# Patient Record
Sex: FEMALE | Race: BLACK OR AFRICAN AMERICAN | NOT HISPANIC OR LATINO | Employment: UNEMPLOYED | URBAN - METROPOLITAN AREA
[De-identification: names, ages, dates, MRNs, and addresses within clinical notes are randomized per-mention and may not be internally consistent; named-entity substitution may affect disease eponyms.]

---

## 2021-02-24 LAB
ABO + RH BLD: NORMAL
HBV SURFACE AG SERPL QL IA: NEGATIVE
HCT VFR BLD AUTO: 35 % (ref 36–46)
HEMOGLOBIN BANDS: NORMAL
HGB BLD-MCNC: 11.6 G/DL (ref 12–16)
HIV 1+2 AB+HIV1 P24 AG SERPL QL IA: NONREACTIVE
INDIRECT COOMBS: NORMAL
PLATELET # BLD AUTO: 274 K/UL (ref 150–399)
RPR: NONREACTIVE
RUBELLA IMMUNE STATUS: NORMAL

## 2021-06-17 ENCOUNTER — INITIAL PRENATAL (OUTPATIENT)
Dept: OBSTETRICS AND GYNECOLOGY | Facility: CLINIC | Age: 26
End: 2021-06-17
Payer: MEDICAID

## 2021-06-17 VITALS — SYSTOLIC BLOOD PRESSURE: 110 MMHG | DIASTOLIC BLOOD PRESSURE: 64 MMHG | WEIGHT: 176.81 LBS

## 2021-06-17 DIAGNOSIS — Z3A.22 22 WEEKS GESTATION OF PREGNANCY: Primary | ICD-10-CM

## 2021-06-17 LAB
BILIRUB SERPL-MCNC: NORMAL MG/DL
BLOOD URINE, POC: NORMAL
CLARITY, POC UA: CLEAR
COLOR, POC UA: YELLOW
GLUCOSE UR QL STRIP: NORMAL
KETONES UR QL STRIP: NORMAL
LEUKOCYTE ESTERASE URINE, POC: NORMAL
NITRITE, POC UA: NORMAL
PH, POC UA: 6
PROTEIN, POC: NORMAL
SPECIFIC GRAVITY, POC UA: NORMAL
UROBILINOGEN, POC UA: NORMAL

## 2021-06-17 PROCEDURE — 99203 PR OFFICE/OUTPT VISIT, NEW, LEVL III, 30-44 MIN: ICD-10-PCS | Mod: TH,S$PBB,, | Performed by: OBSTETRICS & GYNECOLOGY

## 2021-06-17 PROCEDURE — 99999 PR PBB SHADOW E&M-NEW PATIENT-LVL II: ICD-10-PCS | Mod: PBBFAC,,, | Performed by: OBSTETRICS & GYNECOLOGY

## 2021-06-17 PROCEDURE — 99202 OFFICE O/P NEW SF 15 MIN: CPT | Mod: PBBFAC,PN | Performed by: OBSTETRICS & GYNECOLOGY

## 2021-06-17 PROCEDURE — 81002 URINALYSIS NONAUTO W/O SCOPE: CPT | Mod: PBBFAC,PN | Performed by: OBSTETRICS & GYNECOLOGY

## 2021-06-17 PROCEDURE — 99999 PR PBB SHADOW E&M-NEW PATIENT-LVL II: CPT | Mod: PBBFAC,,, | Performed by: OBSTETRICS & GYNECOLOGY

## 2021-06-17 PROCEDURE — 99203 OFFICE O/P NEW LOW 30 MIN: CPT | Mod: TH,S$PBB,, | Performed by: OBSTETRICS & GYNECOLOGY

## 2021-06-22 ENCOUNTER — DOCUMENTATION ONLY (OUTPATIENT)
Dept: OBSTETRICS AND GYNECOLOGY | Facility: CLINIC | Age: 26
End: 2021-06-22

## 2021-07-01 ENCOUNTER — HOSPITAL ENCOUNTER (OUTPATIENT)
Dept: RADIOLOGY | Facility: HOSPITAL | Age: 26
Discharge: HOME | End: 2021-07-01
Attending: OBSTETRICS & GYNECOLOGY
Payer: MEDICAID

## 2021-07-01 DIAGNOSIS — Z3A.22 22 WEEKS GESTATION OF PREGNANCY: ICD-10-CM

## 2021-07-01 PROCEDURE — 76805 US OB 14+ WEEKS, TRANSABDOM, SINGLE GESTATION: ICD-10-PCS | Mod: 26,,, | Performed by: RADIOLOGY

## 2021-07-01 PROCEDURE — 76805 OB US >/= 14 WKS SNGL FETUS: CPT | Mod: 26,,, | Performed by: RADIOLOGY

## 2021-07-01 PROCEDURE — 76805 OB US >/= 14 WKS SNGL FETUS: CPT | Mod: TC,PN

## 2021-07-02 ENCOUNTER — ROUTINE PRENATAL (OUTPATIENT)
Dept: OBSTETRICS AND GYNECOLOGY | Facility: CLINIC | Age: 26
End: 2021-07-02
Payer: MEDICAID

## 2021-07-02 VITALS — WEIGHT: 179.88 LBS | SYSTOLIC BLOOD PRESSURE: 120 MMHG | DIASTOLIC BLOOD PRESSURE: 60 MMHG

## 2021-07-02 DIAGNOSIS — Z3A.24 24 WEEKS GESTATION OF PREGNANCY: Primary | ICD-10-CM

## 2021-07-02 LAB
BILIRUB SERPL-MCNC: NORMAL MG/DL
BLOOD URINE, POC: NORMAL
CLARITY, POC UA: NORMAL
COLOR, POC UA: NORMAL
GLUCOSE UR QL STRIP: NORMAL
KETONES UR QL STRIP: NORMAL
LEUKOCYTE ESTERASE URINE, POC: NORMAL
NITRITE, POC UA: NORMAL
PH, POC UA: 6
PROTEIN, POC: NORMAL
SPECIFIC GRAVITY, POC UA: NORMAL
UROBILINOGEN, POC UA: NORMAL

## 2021-07-02 PROCEDURE — 99211 OFF/OP EST MAY X REQ PHY/QHP: CPT | Mod: PBBFAC,PN | Performed by: SPECIALIST

## 2021-07-02 PROCEDURE — 99999 PR PBB SHADOW E&M-EST. PATIENT-LVL I: CPT | Mod: PBBFAC,,, | Performed by: SPECIALIST

## 2021-07-02 PROCEDURE — 99999 PR PBB SHADOW E&M-EST. PATIENT-LVL I: ICD-10-PCS | Mod: PBBFAC,,, | Performed by: SPECIALIST

## 2021-07-02 PROCEDURE — 99213 PR OFFICE/OUTPT VISIT, EST, LEVL III, 20-29 MIN: ICD-10-PCS | Mod: TH,S$PBB,, | Performed by: SPECIALIST

## 2021-07-02 PROCEDURE — 99213 OFFICE O/P EST LOW 20 MIN: CPT | Mod: TH,S$PBB,, | Performed by: SPECIALIST

## 2021-07-02 PROCEDURE — 81002 URINALYSIS NONAUTO W/O SCOPE: CPT | Mod: PBBFAC,PN | Performed by: SPECIALIST

## 2021-07-06 ENCOUNTER — PATIENT MESSAGE (OUTPATIENT)
Dept: ADMINISTRATIVE | Facility: OTHER | Age: 26
End: 2021-07-06

## 2021-07-30 ENCOUNTER — LAB VISIT (OUTPATIENT)
Dept: LAB | Facility: HOSPITAL | Age: 26
End: 2021-07-30
Attending: SPECIALIST
Payer: MEDICAID

## 2021-07-30 ENCOUNTER — ROUTINE PRENATAL (OUTPATIENT)
Dept: OBSTETRICS AND GYNECOLOGY | Facility: CLINIC | Age: 26
End: 2021-07-30
Payer: MEDICAID

## 2021-07-30 VITALS — SYSTOLIC BLOOD PRESSURE: 122 MMHG | WEIGHT: 180.75 LBS | DIASTOLIC BLOOD PRESSURE: 70 MMHG

## 2021-07-30 DIAGNOSIS — Z3A.24 24 WEEKS GESTATION OF PREGNANCY: ICD-10-CM

## 2021-07-30 DIAGNOSIS — Z3A.28 28 WEEKS GESTATION OF PREGNANCY: Primary | ICD-10-CM

## 2021-07-30 LAB
BILIRUB SERPL-MCNC: NEGATIVE MG/DL
BLOOD URINE, POC: NEGATIVE
CLARITY, POC UA: NORMAL
COLOR, POC UA: NORMAL
GLUCOSE SERPL-MCNC: 127 MG/DL (ref 70–140)
GLUCOSE UR QL STRIP: NORMAL
KETONES UR QL STRIP: NEGATIVE
LEUKOCYTE ESTERASE URINE, POC: NORMAL
NITRITE, POC UA: NEGATIVE
PH, POC UA: 7
PROTEIN, POC: NORMAL
SPECIFIC GRAVITY, POC UA: NORMAL
UROBILINOGEN, POC UA: NORMAL

## 2021-07-30 PROCEDURE — 99999 PR PBB SHADOW E&M-EST. PATIENT-LVL II: CPT | Mod: PBBFAC,,, | Performed by: OBSTETRICS & GYNECOLOGY

## 2021-07-30 PROCEDURE — 81002 URINALYSIS NONAUTO W/O SCOPE: CPT | Mod: PBBFAC,PN | Performed by: OBSTETRICS & GYNECOLOGY

## 2021-07-30 PROCEDURE — 99213 OFFICE O/P EST LOW 20 MIN: CPT | Mod: TH,S$PBB,, | Performed by: OBSTETRICS & GYNECOLOGY

## 2021-07-30 PROCEDURE — 99212 OFFICE O/P EST SF 10 MIN: CPT | Mod: PBBFAC,PN | Performed by: OBSTETRICS & GYNECOLOGY

## 2021-07-30 PROCEDURE — 99999 PR PBB SHADOW E&M-EST. PATIENT-LVL II: ICD-10-PCS | Mod: PBBFAC,,, | Performed by: OBSTETRICS & GYNECOLOGY

## 2021-07-30 PROCEDURE — 36415 COLL VENOUS BLD VENIPUNCTURE: CPT | Mod: PO | Performed by: SPECIALIST

## 2021-07-30 PROCEDURE — 99213 PR OFFICE/OUTPT VISIT, EST, LEVL III, 20-29 MIN: ICD-10-PCS | Mod: TH,S$PBB,, | Performed by: OBSTETRICS & GYNECOLOGY

## 2021-07-30 PROCEDURE — 82950 GLUCOSE TEST: CPT | Performed by: SPECIALIST

## 2021-08-13 ENCOUNTER — ROUTINE PRENATAL (OUTPATIENT)
Dept: OBSTETRICS AND GYNECOLOGY | Facility: CLINIC | Age: 26
End: 2021-08-13
Payer: MEDICAID

## 2021-08-13 VITALS — SYSTOLIC BLOOD PRESSURE: 112 MMHG | DIASTOLIC BLOOD PRESSURE: 70 MMHG | WEIGHT: 181.44 LBS

## 2021-08-13 DIAGNOSIS — Z3A.30 30 WEEKS GESTATION OF PREGNANCY: Primary | ICD-10-CM

## 2021-08-13 LAB
BILIRUB SERPL-MCNC: NORMAL MG/DL
BLOOD URINE, POC: NORMAL
CLARITY, POC UA: NORMAL
COLOR, POC UA: NORMAL
GLUCOSE UR QL STRIP: NORMAL
KETONES UR QL STRIP: NORMAL
LEUKOCYTE ESTERASE URINE, POC: NORMAL
NITRITE, POC UA: NORMAL
PH, POC UA: 5
PROTEIN, POC: NORMAL
SPECIFIC GRAVITY, POC UA: NORMAL
UROBILINOGEN, POC UA: NORMAL

## 2021-08-13 PROCEDURE — 99999 PR PBB SHADOW E&M-EST. PATIENT-LVL II: CPT | Mod: PBBFAC,,, | Performed by: OBSTETRICS & GYNECOLOGY

## 2021-08-13 PROCEDURE — 81002 URINALYSIS NONAUTO W/O SCOPE: CPT | Mod: PBBFAC,PN | Performed by: OBSTETRICS & GYNECOLOGY

## 2021-08-13 PROCEDURE — 99213 PR OFFICE/OUTPT VISIT, EST, LEVL III, 20-29 MIN: ICD-10-PCS | Mod: TH,S$PBB,, | Performed by: OBSTETRICS & GYNECOLOGY

## 2021-08-13 PROCEDURE — 99999 PR PBB SHADOW E&M-EST. PATIENT-LVL II: ICD-10-PCS | Mod: PBBFAC,,, | Performed by: OBSTETRICS & GYNECOLOGY

## 2021-08-13 PROCEDURE — 99213 OFFICE O/P EST LOW 20 MIN: CPT | Mod: TH,S$PBB,, | Performed by: OBSTETRICS & GYNECOLOGY

## 2021-08-13 PROCEDURE — 99212 OFFICE O/P EST SF 10 MIN: CPT | Mod: PBBFAC,PN | Performed by: OBSTETRICS & GYNECOLOGY

## 2021-08-19 ENCOUNTER — PATIENT MESSAGE (OUTPATIENT)
Dept: ADMINISTRATIVE | Facility: OTHER | Age: 26
End: 2021-08-19

## 2021-08-26 ENCOUNTER — ROUTINE PRENATAL (OUTPATIENT)
Dept: OBSTETRICS AND GYNECOLOGY | Facility: CLINIC | Age: 26
End: 2021-08-26
Payer: MEDICAID

## 2021-08-26 VITALS — WEIGHT: 186.31 LBS | SYSTOLIC BLOOD PRESSURE: 112 MMHG | DIASTOLIC BLOOD PRESSURE: 64 MMHG

## 2021-08-26 DIAGNOSIS — Z3A.33 33 WEEKS GESTATION OF PREGNANCY: Primary | ICD-10-CM

## 2021-08-26 LAB
BILIRUB SERPL-MCNC: NORMAL MG/DL
BLOOD URINE, POC: NORMAL
CLARITY, POC UA: CLEAR
COLOR, POC UA: NORMAL
GLUCOSE UR QL STRIP: NORMAL
KETONES UR QL STRIP: NORMAL
LEUKOCYTE ESTERASE URINE, POC: NORMAL
NITRITE, POC UA: NORMAL
PH, POC UA: 8
PROTEIN, POC: NORMAL
SPECIFIC GRAVITY, POC UA: NORMAL
UROBILINOGEN, POC UA: NORMAL

## 2021-08-26 PROCEDURE — 99213 PR OFFICE/OUTPT VISIT, EST, LEVL III, 20-29 MIN: ICD-10-PCS | Mod: TH,S$PBB,, | Performed by: OBSTETRICS & GYNECOLOGY

## 2021-08-26 PROCEDURE — 99999 PR PBB SHADOW E&M-EST. PATIENT-LVL II: ICD-10-PCS | Mod: PBBFAC,,, | Performed by: OBSTETRICS & GYNECOLOGY

## 2021-08-26 PROCEDURE — 81002 URINALYSIS NONAUTO W/O SCOPE: CPT | Mod: PBBFAC,PN | Performed by: OBSTETRICS & GYNECOLOGY

## 2021-08-26 PROCEDURE — 99213 OFFICE O/P EST LOW 20 MIN: CPT | Mod: TH,S$PBB,, | Performed by: OBSTETRICS & GYNECOLOGY

## 2021-08-26 PROCEDURE — 99999 PR PBB SHADOW E&M-EST. PATIENT-LVL II: CPT | Mod: PBBFAC,,, | Performed by: OBSTETRICS & GYNECOLOGY

## 2021-08-26 PROCEDURE — 99212 OFFICE O/P EST SF 10 MIN: CPT | Mod: PBBFAC,PN | Performed by: OBSTETRICS & GYNECOLOGY

## 2021-09-08 ENCOUNTER — TELEPHONE (OUTPATIENT)
Dept: PEDIATRICS | Facility: CLINIC | Age: 26
End: 2021-09-08

## 2021-09-13 ENCOUNTER — ROUTINE PRENATAL (OUTPATIENT)
Dept: OBSTETRICS AND GYNECOLOGY | Facility: CLINIC | Age: 26
End: 2021-09-13
Payer: MEDICAID

## 2021-09-13 ENCOUNTER — HOSPITAL ENCOUNTER (OUTPATIENT)
Dept: RADIOLOGY | Facility: HOSPITAL | Age: 26
Discharge: HOME | End: 2021-09-13
Attending: OBSTETRICS & GYNECOLOGY
Payer: MEDICAID

## 2021-09-13 DIAGNOSIS — Z3A.35 35 WEEKS GESTATION OF PREGNANCY: Primary | ICD-10-CM

## 2021-09-13 DIAGNOSIS — Z3A.33 33 WEEKS GESTATION OF PREGNANCY: ICD-10-CM

## 2021-09-13 LAB
BILIRUB SERPL-MCNC: NEGATIVE MG/DL
BLOOD URINE, POC: NEGATIVE
CLARITY, POC UA: NORMAL
COLOR, POC UA: NORMAL
GLUCOSE UR QL STRIP: NORMAL
KETONES UR QL STRIP: NORMAL
LEUKOCYTE ESTERASE URINE, POC: NORMAL
NITRITE, POC UA: NEGATIVE
PH, POC UA: 5
PROTEIN, POC: NEGATIVE
SPECIFIC GRAVITY, POC UA: NORMAL
UROBILINOGEN, POC UA: NORMAL

## 2021-09-13 PROCEDURE — 99213 PR OFFICE/OUTPT VISIT, EST, LEVL III, 20-29 MIN: ICD-10-PCS | Mod: TH,S$PBB,, | Performed by: OBSTETRICS & GYNECOLOGY

## 2021-09-13 PROCEDURE — 99999 PR PBB SHADOW E&M-EST. PATIENT-LVL I: CPT | Mod: PBBFAC,,, | Performed by: OBSTETRICS & GYNECOLOGY

## 2021-09-13 PROCEDURE — 76816 OB US FOLLOW-UP PER FETUS: CPT | Mod: 26,,, | Performed by: RADIOLOGY

## 2021-09-13 PROCEDURE — 99213 OFFICE O/P EST LOW 20 MIN: CPT | Mod: TH,S$PBB,, | Performed by: OBSTETRICS & GYNECOLOGY

## 2021-09-13 PROCEDURE — 76816 US OB FOLLOW UP EA GESTATION TRANSABDOMINAL: ICD-10-PCS | Mod: 26,,, | Performed by: RADIOLOGY

## 2021-09-13 PROCEDURE — 76816 OB US FOLLOW-UP PER FETUS: CPT | Mod: TC,PN

## 2021-09-13 PROCEDURE — 81002 URINALYSIS NONAUTO W/O SCOPE: CPT | Mod: PBBFAC,PN | Performed by: OBSTETRICS & GYNECOLOGY

## 2021-09-13 PROCEDURE — 87086 URINE CULTURE/COLONY COUNT: CPT | Performed by: OBSTETRICS & GYNECOLOGY

## 2021-09-13 PROCEDURE — 99999 PR PBB SHADOW E&M-EST. PATIENT-LVL I: ICD-10-PCS | Mod: PBBFAC,,, | Performed by: OBSTETRICS & GYNECOLOGY

## 2021-09-13 PROCEDURE — 87081 CULTURE SCREEN ONLY: CPT | Performed by: OBSTETRICS & GYNECOLOGY

## 2021-09-13 PROCEDURE — 99211 OFF/OP EST MAY X REQ PHY/QHP: CPT | Mod: PBBFAC,PN | Performed by: OBSTETRICS & GYNECOLOGY

## 2021-09-15 LAB
BACTERIA UR CULT: NORMAL
BACTERIA UR CULT: NORMAL

## 2021-09-17 LAB — BACTERIA SPEC AEROBE CULT: NORMAL

## 2021-09-21 ENCOUNTER — ROUTINE PRENATAL (OUTPATIENT)
Dept: OBSTETRICS AND GYNECOLOGY | Facility: CLINIC | Age: 26
End: 2021-09-21
Payer: MEDICAID

## 2021-09-21 VITALS — DIASTOLIC BLOOD PRESSURE: 80 MMHG | WEIGHT: 191.81 LBS | SYSTOLIC BLOOD PRESSURE: 130 MMHG

## 2021-09-21 DIAGNOSIS — Z3A.36 36 WEEKS GESTATION OF PREGNANCY: Primary | ICD-10-CM

## 2021-09-21 LAB
BILIRUB SERPL-MCNC: NORMAL MG/DL
BLOOD URINE, POC: NORMAL
CLARITY, POC UA: CLEAR
COLOR, POC UA: NORMAL
GLUCOSE UR QL STRIP: NORMAL
KETONES UR QL STRIP: NORMAL
LEUKOCYTE ESTERASE URINE, POC: NORMAL
NITRITE, POC UA: NORMAL
PH, POC UA: 5
PROTEIN, POC: NORMAL
SPECIFIC GRAVITY, POC UA: NORMAL
UROBILINOGEN, POC UA: NORMAL

## 2021-09-21 PROCEDURE — 99999 PR PBB SHADOW E&M-EST. PATIENT-LVL II: CPT | Mod: PBBFAC,,, | Performed by: OBSTETRICS & GYNECOLOGY

## 2021-09-21 PROCEDURE — 99999 PR PBB SHADOW E&M-EST. PATIENT-LVL II: ICD-10-PCS | Mod: PBBFAC,,, | Performed by: OBSTETRICS & GYNECOLOGY

## 2021-09-21 PROCEDURE — 99213 OFFICE O/P EST LOW 20 MIN: CPT | Mod: TH,S$PBB,, | Performed by: OBSTETRICS & GYNECOLOGY

## 2021-09-21 PROCEDURE — 81002 URINALYSIS NONAUTO W/O SCOPE: CPT | Mod: PBBFAC,PN | Performed by: OBSTETRICS & GYNECOLOGY

## 2021-09-21 PROCEDURE — 99212 OFFICE O/P EST SF 10 MIN: CPT | Mod: PBBFAC,PN | Performed by: OBSTETRICS & GYNECOLOGY

## 2021-09-21 PROCEDURE — 99213 PR OFFICE/OUTPT VISIT, EST, LEVL III, 20-29 MIN: ICD-10-PCS | Mod: TH,S$PBB,, | Performed by: OBSTETRICS & GYNECOLOGY

## 2021-09-24 ENCOUNTER — TELEPHONE (OUTPATIENT)
Dept: OBSTETRICS AND GYNECOLOGY | Facility: CLINIC | Age: 26
End: 2021-09-24

## 2021-10-01 ENCOUNTER — TELEPHONE (OUTPATIENT)
Dept: OBSTETRICS AND GYNECOLOGY | Facility: CLINIC | Age: 26
End: 2021-10-01

## 2021-10-04 ENCOUNTER — ROUTINE PRENATAL (OUTPATIENT)
Dept: OBSTETRICS AND GYNECOLOGY | Facility: CLINIC | Age: 26
End: 2021-10-04
Payer: MEDICAID

## 2021-10-04 VITALS — DIASTOLIC BLOOD PRESSURE: 88 MMHG | SYSTOLIC BLOOD PRESSURE: 120 MMHG | WEIGHT: 193.13 LBS

## 2021-10-04 DIAGNOSIS — Z3A.38 38 WEEKS GESTATION OF PREGNANCY: Primary | ICD-10-CM

## 2021-10-04 LAB
BILIRUB SERPL-MCNC: NEGATIVE MG/DL
BLOOD URINE, POC: NEGATIVE
CLARITY, POC UA: NORMAL
COLOR, POC UA: NORMAL
GLUCOSE UR QL STRIP: NORMAL
KETONES UR QL STRIP: NEGATIVE
LEUKOCYTE ESTERASE URINE, POC: NEGATIVE
NITRITE, POC UA: NEGATIVE
PH, POC UA: 7
PROTEIN, POC: NEGATIVE
SPECIFIC GRAVITY, POC UA: NORMAL
UROBILINOGEN, POC UA: NORMAL

## 2021-10-04 PROCEDURE — 99213 PR OFFICE/OUTPT VISIT, EST, LEVL III, 20-29 MIN: ICD-10-PCS | Mod: TH,S$PBB,, | Performed by: OBSTETRICS & GYNECOLOGY

## 2021-10-04 PROCEDURE — 99999 PR PBB SHADOW E&M-EST. PATIENT-LVL II: ICD-10-PCS | Mod: PBBFAC,,, | Performed by: OBSTETRICS & GYNECOLOGY

## 2021-10-04 PROCEDURE — 99213 OFFICE O/P EST LOW 20 MIN: CPT | Mod: TH,S$PBB,, | Performed by: OBSTETRICS & GYNECOLOGY

## 2021-10-04 PROCEDURE — 81002 URINALYSIS NONAUTO W/O SCOPE: CPT | Mod: PBBFAC,PN | Performed by: OBSTETRICS & GYNECOLOGY

## 2021-10-04 PROCEDURE — 99999 PR PBB SHADOW E&M-EST. PATIENT-LVL II: CPT | Mod: PBBFAC,,, | Performed by: OBSTETRICS & GYNECOLOGY

## 2021-10-04 PROCEDURE — 99212 OFFICE O/P EST SF 10 MIN: CPT | Mod: PBBFAC,PN | Performed by: OBSTETRICS & GYNECOLOGY

## 2021-10-09 PROBLEM — Z34.90 TERM PREGNANCY: Status: ACTIVE | Noted: 2021-10-09

## 2021-10-13 ENCOUNTER — TELEPHONE (OUTPATIENT)
Dept: OBSTETRICS AND GYNECOLOGY | Facility: CLINIC | Age: 26
End: 2021-10-13

## 2021-10-26 ENCOUNTER — POSTPARTUM VISIT (OUTPATIENT)
Dept: OBSTETRICS AND GYNECOLOGY | Facility: CLINIC | Age: 26
End: 2021-10-26
Payer: MEDICAID

## 2021-10-26 VITALS
DIASTOLIC BLOOD PRESSURE: 66 MMHG | WEIGHT: 176.81 LBS | SYSTOLIC BLOOD PRESSURE: 116 MMHG | BODY MASS INDEX: 31.33 KG/M2 | HEIGHT: 63 IN | RESPIRATION RATE: 16 BRPM

## 2021-10-26 PROCEDURE — 59430 PR CARE AFTER DELIVERY ONLY: ICD-10-PCS | Mod: ,,, | Performed by: OBSTETRICS & GYNECOLOGY

## 2021-10-26 PROCEDURE — 99999 PR PBB SHADOW E&M-EST. PATIENT-LVL III: CPT | Mod: PBBFAC,,, | Performed by: OBSTETRICS & GYNECOLOGY

## 2021-10-26 PROCEDURE — 99213 OFFICE O/P EST LOW 20 MIN: CPT | Mod: PBBFAC,PN | Performed by: OBSTETRICS & GYNECOLOGY

## 2021-10-26 PROCEDURE — 99999 PR PBB SHADOW E&M-EST. PATIENT-LVL III: ICD-10-PCS | Mod: PBBFAC,,, | Performed by: OBSTETRICS & GYNECOLOGY

## 2021-11-29 ENCOUNTER — POSTPARTUM VISIT (OUTPATIENT)
Dept: OBSTETRICS AND GYNECOLOGY | Facility: CLINIC | Age: 26
End: 2021-11-29
Payer: MEDICAID

## 2021-11-29 VITALS
DIASTOLIC BLOOD PRESSURE: 68 MMHG | WEIGHT: 181 LBS | SYSTOLIC BLOOD PRESSURE: 116 MMHG | HEIGHT: 63 IN | BODY MASS INDEX: 32.07 KG/M2

## 2021-11-29 DIAGNOSIS — Z01.419 ENCOUNTER FOR GYNECOLOGICAL EXAMINATION WITHOUT ABNORMAL FINDING: Primary | ICD-10-CM

## 2021-11-29 PROCEDURE — 99999 PR PBB SHADOW E&M-EST. PATIENT-LVL III: ICD-10-PCS | Mod: PBBFAC,,, | Performed by: OBSTETRICS & GYNECOLOGY

## 2021-11-29 PROCEDURE — 0503F POSTPARTUM CARE VISIT: CPT | Mod: ,,, | Performed by: OBSTETRICS & GYNECOLOGY

## 2021-11-29 PROCEDURE — 88141 CYTOPATH C/V INTERPRET: CPT | Mod: ,,, | Performed by: PATHOLOGY

## 2021-11-29 PROCEDURE — 99213 OFFICE O/P EST LOW 20 MIN: CPT | Mod: PBBFAC,PN | Performed by: OBSTETRICS & GYNECOLOGY

## 2021-11-29 PROCEDURE — 88141 PR  CYTOPATH CERV/VAG INTERPRET: ICD-10-PCS | Mod: ,,, | Performed by: PATHOLOGY

## 2021-11-29 PROCEDURE — 99999 PR PBB SHADOW E&M-EST. PATIENT-LVL III: CPT | Mod: PBBFAC,,, | Performed by: OBSTETRICS & GYNECOLOGY

## 2021-11-29 PROCEDURE — 88175 CYTOPATH C/V AUTO FLUID REDO: CPT | Performed by: PATHOLOGY

## 2021-11-29 PROCEDURE — 0503F PR POSTPARTUM CARE VISIT: ICD-10-PCS | Mod: ,,, | Performed by: OBSTETRICS & GYNECOLOGY

## 2021-12-03 LAB
FINAL PATHOLOGIC DIAGNOSIS: NORMAL
Lab: NORMAL

## 2022-01-03 ENCOUNTER — LAB VISIT (OUTPATIENT)
Dept: PRIMARY CARE CLINIC | Facility: OTHER | Age: 27
End: 2022-01-03
Payer: MEDICAID

## 2022-01-03 ENCOUNTER — PATIENT MESSAGE (OUTPATIENT)
Dept: ADMINISTRATIVE | Facility: OTHER | Age: 27
End: 2022-01-03
Payer: MEDICAID

## 2022-01-03 DIAGNOSIS — Z20.822 ENCOUNTER FOR LABORATORY TESTING FOR COVID-19 VIRUS: ICD-10-CM

## 2022-01-03 PROCEDURE — U0003 INFECTIOUS AGENT DETECTION BY NUCLEIC ACID (DNA OR RNA); SEVERE ACUTE RESPIRATORY SYNDROME CORONAVIRUS 2 (SARS-COV-2) (CORONAVIRUS DISEASE [COVID-19]), AMPLIFIED PROBE TECHNIQUE, MAKING USE OF HIGH THROUGHPUT TECHNOLOGIES AS DESCRIBED BY CMS-2020-01-R: HCPCS

## 2022-01-07 LAB
SARS-COV-2 RNA RESP QL NAA+PROBE: DETECTED
SARS-COV-2- CYCLE NUMBER: 26

## 2024-05-27 ENCOUNTER — TELEPHONE (OUTPATIENT)
Dept: OBSTETRICS AND GYNECOLOGY | Facility: CLINIC | Age: 29
End: 2024-05-27

## 2024-05-27 NOTE — TELEPHONE ENCOUNTER
----- Message from Jaqui Shah sent at 5/27/2024 12:49 PM CDT -----  Contact: self  Patient is requesting a call back regarding NOB appt - positive pregnancy test. Please call back at [phone]

## 2024-06-20 DIAGNOSIS — Z34.81 ENCOUNTER FOR SUPERVISION OF NORMAL PREGNANCY IN MULTIGRAVIDA IN FIRST TRIMESTER: Primary | ICD-10-CM

## 2024-06-24 ENCOUNTER — PROCEDURE VISIT (OUTPATIENT)
Dept: OBSTETRICS AND GYNECOLOGY | Facility: CLINIC | Age: 29
End: 2024-06-24
Payer: MEDICAID

## 2024-06-24 DIAGNOSIS — Z34.81 ENCOUNTER FOR SUPERVISION OF NORMAL PREGNANCY IN MULTIGRAVIDA IN FIRST TRIMESTER: ICD-10-CM

## 2024-06-24 PROCEDURE — 76817 TRANSVAGINAL US OBSTETRIC: CPT | Mod: S$GLB,,, | Performed by: OBSTETRICS & GYNECOLOGY

## 2024-06-27 DIAGNOSIS — Z34.91 ENCOUNTER FOR PREGNANCY RELATED EXAMINATION IN FIRST TRIMESTER: Primary | ICD-10-CM

## 2024-07-01 ENCOUNTER — PROCEDURE VISIT (OUTPATIENT)
Dept: OBSTETRICS AND GYNECOLOGY | Facility: CLINIC | Age: 29
End: 2024-07-01
Payer: MEDICAID

## 2024-07-01 ENCOUNTER — PATIENT MESSAGE (OUTPATIENT)
Dept: OBSTETRICS AND GYNECOLOGY | Facility: CLINIC | Age: 29
End: 2024-07-01
Payer: MEDICAID

## 2024-07-01 DIAGNOSIS — Z34.91 ENCOUNTER FOR PREGNANCY RELATED EXAMINATION IN FIRST TRIMESTER: ICD-10-CM

## 2024-07-01 DIAGNOSIS — Z34.90 EARLY STAGE OF PREGNANCY: Primary | ICD-10-CM

## 2024-07-01 LAB — B-HCG SERPL-ACNC: NORMAL MIU/ML

## 2024-07-01 PROCEDURE — 76817 TRANSVAGINAL US OBSTETRIC: CPT | Mod: S$GLB,,, | Performed by: OBSTETRICS & GYNECOLOGY

## 2024-07-02 ENCOUNTER — OFFICE VISIT (OUTPATIENT)
Dept: OBSTETRICS AND GYNECOLOGY | Facility: CLINIC | Age: 29
End: 2024-07-02
Payer: MEDICAID

## 2024-07-02 ENCOUNTER — PROCEDURE VISIT (OUTPATIENT)
Dept: OBSTETRICS AND GYNECOLOGY | Facility: CLINIC | Age: 29
End: 2024-07-02
Payer: MEDICAID

## 2024-07-02 VITALS
HEART RATE: 99 BPM | SYSTOLIC BLOOD PRESSURE: 142 MMHG | BODY MASS INDEX: 38.26 KG/M2 | WEIGHT: 216 LBS | DIASTOLIC BLOOD PRESSURE: 94 MMHG

## 2024-07-02 DIAGNOSIS — O02.1 MISSED AB: Primary | ICD-10-CM

## 2024-07-02 DIAGNOSIS — O02.1 MISSED AB: ICD-10-CM

## 2024-07-02 PROCEDURE — 76817 TRANSVAGINAL US OBSTETRIC: CPT | Mod: S$GLB,,, | Performed by: OBSTETRICS & GYNECOLOGY

## 2024-07-02 NOTE — PROGRESS NOTES
28 y.o.  presents for MAB.  No LMP recorded (lmp unknown)..    Past Medical History:   Diagnosis Date    Abnormal Pap smear of cervix      History reviewed. No pertinent surgical history.  Family History   Problem Relation Name Age of Onset    Ovarian cancer Maternal Grandmother      Breast cancer Neg Hx       Review of patient's allergies indicates:  No Known Allergies  No current outpatient medications on file.  Social History     Socioeconomic History    Marital status: Single   Tobacco Use    Smoking status: Never    Smokeless tobacco: Never   Substance and Sexual Activity    Alcohol use: Not Currently    Drug use: Never    Sexual activity: Yes     Partners: Male     Birth control/protection: None       Review of Systems   Constitutional:  Negative for chills and fever.   Respiratory:  Negative for shortness of breath.    Cardiovascular:  Negative for chest pain.   Gastrointestinal:  Negative for abdominal pain, blood in stool, constipation, diarrhea, nausea, vomiting and reflux.   Genitourinary:  Negative for dysmenorrhea, dyspareunia, dysuria, hematuria, hot flashes, menorrhagia, menstrual problem, pelvic pain, vaginal bleeding, vaginal discharge, postcoital bleeding and vaginal dryness.   Musculoskeletal:  Negative for arthralgias and joint swelling.   Integumentary:  Negative for rash and hair changes.   Psychiatric/Behavioral:  Negative for depression. The patient is not nervous/anxious.        Vitals:    24 0732   BP: (!) 142/94   Pulse: 99       Physical Exam:   Constitutional: She appears well-developed and well-nourished. No distress.    HENT:   Head: Normocephalic.    Eyes: Conjunctivae and EOM are normal.    Neck: No tracheal deviation present. No thyromegaly present.    Cardiovascular:       Exam reveals no clubbing, no cyanosis and no edema.        Pulmonary/Chest: Effort normal. No respiratory distress.                  Musculoskeletal: Normal range of motion and moves all extremeties.         Skin: No rash noted. She is not diaphoretic. No cyanosis. Nails show no clubbing.    Psychiatric: She has a normal mood and affect. Her behavior is normal. Judgment and thought content normal.     Vaginal blood in the vault    Last ultrasound today- no gest sac- yesterday had gest sac    Assessment: SAB    Plan: rtc prn

## 2024-10-15 ENCOUNTER — TELEPHONE (OUTPATIENT)
Dept: OBSTETRICS AND GYNECOLOGY | Facility: CLINIC | Age: 29
End: 2024-10-15
Payer: MEDICAID

## 2024-10-18 DIAGNOSIS — Z03.89 ENCOUNTER FOR OBSERVATION FOR OTHER SUSPECTED DISEASES AND CONDITIONS RULED OUT: ICD-10-CM

## 2024-10-18 DIAGNOSIS — Z34.81 ENCOUNTER FOR SUPERVISION OF NORMAL PREGNANCY IN MULTIGRAVIDA IN FIRST TRIMESTER: Primary | ICD-10-CM

## 2024-10-21 ENCOUNTER — PROCEDURE VISIT (OUTPATIENT)
Dept: OBSTETRICS AND GYNECOLOGY | Facility: CLINIC | Age: 29
End: 2024-10-21
Payer: MEDICAID

## 2024-10-21 DIAGNOSIS — Z34.81 ENCOUNTER FOR SUPERVISION OF NORMAL PREGNANCY IN MULTIGRAVIDA IN FIRST TRIMESTER: ICD-10-CM

## 2024-10-21 PROCEDURE — 76801 OB US < 14 WKS SINGLE FETUS: CPT | Mod: 26,S$PBB,, | Performed by: OBSTETRICS & GYNECOLOGY

## 2024-11-11 ENCOUNTER — ROUTINE PRENATAL (OUTPATIENT)
Dept: OBSTETRICS AND GYNECOLOGY | Facility: CLINIC | Age: 29
End: 2024-11-11
Payer: MEDICAID

## 2024-11-11 VITALS
DIASTOLIC BLOOD PRESSURE: 84 MMHG | BODY MASS INDEX: 38.62 KG/M2 | HEART RATE: 91 BPM | WEIGHT: 218 LBS | SYSTOLIC BLOOD PRESSURE: 137 MMHG

## 2024-11-11 DIAGNOSIS — O16.9 ELEVATED BLOOD PRESSURE AFFECTING PREGNANCY, ANTEPARTUM: ICD-10-CM

## 2024-11-11 DIAGNOSIS — Z34.81 ENCOUNTER FOR SUPERVISION OF NORMAL PREGNANCY IN MULTIGRAVIDA IN FIRST TRIMESTER: Primary | ICD-10-CM

## 2024-11-11 NOTE — PROGRESS NOTES
Subjective:       Patient ID: Sofia Lemos is a 29 y.o.  at 12w0d   Chief Complaint:  Routine Prenatal Visit      History of Present Illness  here for new ob exam.  Labs and history were reviewed with the patient today  No complaints      Past Medical History:   Diagnosis Date    Abnormal Pap smear of cervix        No past surgical history on file.    OB:    OB History    Para Term  AB Living   3 1 1   1 1   SAB IAB Ectopic Multiple Live Births         0 1      # Outcome Date GA Lbr Frandy/2nd Weight Sex Type Anes PTL Lv   3 Current            2 Term 10/09/21 39w2d 02:00 / 00:23 3.135 kg (6 lb 14.6 oz) M Vag-Vacuum EPI N MARY   1 AB              Gyn: no STD, never had abn pap   Meds: No current outpatient medications on file.    All: Review of patient's allergies indicates:  No Known Allergies    SH:   Social History     Tobacco Use    Smoking status: Never    Smokeless tobacco: Never   Substance Use Topics    Alcohol use: Not Currently      FH: family history includes Ovarian cancer in her maternal grandmother.      Review of Systems  nml 1st trimester sx- sob, dec excercixe tolerance, fatigue and nausea  Neg for vag bleed, dc, vomiting, cp, lof, fever, chills, ns, visual changes, swelling, headaches, constipation/diarrhea, dysuria, freq/urgency of urination     Objective:     Vitals:    24 1401   BP: 137/84   Pulse: 91   Weight: 98.9 kg (218 lb)       NAD  NCAT  pupils normal size  Skin nml no rashes or lesions  No resp distress, resp even and unlabored  nt nd, no rebound no guarding  nml ext fem gent, normal cvx uterus and adnexa, no dc or bleeding  nml appearing rectum  No cyanosis or clubbing, edema appropriate for pregn    Uterus size approp for gest age         Assessment:        1. Encounter for supervision of normal pregnancy in multigravida in first trimester    2. Elevated blood pressure affecting pregnancy, antepartum               Plan:      Encounter for supervision  of normal pregnancy in multigravida in first trimester  -     Liquid-based pap smear, screening    Elevated blood pressure affecting pregnancy, antepartum  -     Liquid-based pap smear, screening           Pain fever bleeding precautions  Encouraged PNV  rtc 4 wks

## 2024-11-13 LAB
CHLAMYDIA: NEGATIVE
GONORRHEA: NEGATIVE
SOURCE: NORMAL
SOURCE: NORMAL
TRICHOMONAS AMPLIFIED: NEGATIVE

## 2024-11-14 LAB — Lab: NORMAL

## 2024-12-09 ENCOUNTER — ROUTINE PRENATAL (OUTPATIENT)
Dept: OBSTETRICS AND GYNECOLOGY | Facility: CLINIC | Age: 29
End: 2024-12-09
Payer: MEDICAID

## 2024-12-09 ENCOUNTER — PATIENT MESSAGE (OUTPATIENT)
Dept: OTHER | Facility: OTHER | Age: 29
End: 2024-12-09
Payer: MEDICAID

## 2024-12-09 VITALS
BODY MASS INDEX: 38.44 KG/M2 | SYSTOLIC BLOOD PRESSURE: 139 MMHG | DIASTOLIC BLOOD PRESSURE: 83 MMHG | HEART RATE: 103 BPM | WEIGHT: 217 LBS

## 2024-12-09 DIAGNOSIS — Z34.82 ENCOUNTER FOR SUPERVISION OF NORMAL PREGNANCY IN MULTIGRAVIDA IN SECOND TRIMESTER: Primary | ICD-10-CM

## 2024-12-09 DIAGNOSIS — O16.9 ELEVATED BLOOD PRESSURE AFFECTING PREGNANCY, ANTEPARTUM: ICD-10-CM

## 2024-12-09 PROCEDURE — 99213 OFFICE O/P EST LOW 20 MIN: CPT | Mod: S$PBB,TH,, | Performed by: OBSTETRICS & GYNECOLOGY

## 2024-12-09 NOTE — PROGRESS NOTES
Subjective:       Patient ID: Sofia Lemos is a 29 y.o.  at 16w0d      Chief Complaint:  Routine Prenatal Visit      History of Present Illness  No complaints. Reports normal sx. Labs and history reviewed with pt.       Review of Systems  Denies n/v, f/c, dysuria, contractions,   VD, VB, round ligament pain, headaches       Objective:     Vitals:    24 1052   BP: 139/83   Pulse: 103     Wt Readings from Last 3 Encounters:   24 98.4 kg (217 lb)   24 98.9 kg (218 lb)   24 98 kg (216 lb)        nad  NCAT  pupils normal size  Skin nml no rashes or lesions  No resp distress, resp even and unlabored  Gravid nt, no rebound no guarding  No cyanosis or clubbing, edema appropriate for pregn    FHT: 150's       Assessment:        1. Encounter for supervision of normal pregnancy in multigravida in second trimester    2. Elevated blood pressure affecting pregnancy, antepartum                Plan:        Encouraged PNV  Pain, fever, bleeding precautions   RTC 4 weeks

## 2024-12-16 ENCOUNTER — PATIENT MESSAGE (OUTPATIENT)
Dept: OTHER | Facility: OTHER | Age: 29
End: 2024-12-16
Payer: MEDICAID

## 2025-01-06 ENCOUNTER — PATIENT MESSAGE (OUTPATIENT)
Dept: OTHER | Facility: OTHER | Age: 30
End: 2025-01-06
Payer: MEDICAID

## 2025-01-07 DIAGNOSIS — Z34.92 ENCOUNTER FOR PREGNANCY RELATED EXAMINATION IN SECOND TRIMESTER: Primary | ICD-10-CM

## 2025-01-13 ENCOUNTER — PROCEDURE VISIT (OUTPATIENT)
Dept: OBSTETRICS AND GYNECOLOGY | Facility: CLINIC | Age: 30
End: 2025-01-13
Payer: MEDICAID

## 2025-01-13 DIAGNOSIS — Z34.92 ENCOUNTER FOR PREGNANCY RELATED EXAMINATION IN SECOND TRIMESTER: ICD-10-CM

## 2025-01-13 PROCEDURE — 76805 OB US >/= 14 WKS SNGL FETUS: CPT | Mod: 26,S$PBB,, | Performed by: OBSTETRICS & GYNECOLOGY

## 2025-02-10 ENCOUNTER — ROUTINE PRENATAL (OUTPATIENT)
Dept: OBSTETRICS AND GYNECOLOGY | Facility: CLINIC | Age: 30
End: 2025-02-10
Payer: MEDICAID

## 2025-02-10 VITALS
BODY MASS INDEX: 39.15 KG/M2 | SYSTOLIC BLOOD PRESSURE: 121 MMHG | WEIGHT: 221 LBS | DIASTOLIC BLOOD PRESSURE: 69 MMHG | HEART RATE: 86 BPM

## 2025-02-10 DIAGNOSIS — Z34.83 ENCOUNTER FOR SUPERVISION OF NORMAL PREGNANCY IN MULTIGRAVIDA IN THIRD TRIMESTER: Primary | ICD-10-CM

## 2025-02-10 PROCEDURE — 99213 OFFICE O/P EST LOW 20 MIN: CPT | Mod: S$PBB,TH,, | Performed by: OBSTETRICS & GYNECOLOGY

## 2025-02-10 NOTE — PROGRESS NOTES
Subjective:       Patient ID: Sofia Lemos is a 29 y.o.  at 25w0d     Chief Complaint:  Routine Prenatal Visit      History of Present Illness  No complaints. Reports normal sx. Labs and history reviewed with pt.         Review of Systems  Denies n/v, f/c, dysuria, contractions,   VD, VB, round ligament pain, headaches, preE ROS       Objective:     Vitals:    02/10/25 1110   BP: 121/69   Pulse: 86     Wt Readings from Last 3 Encounters:   02/10/25 100.2 kg (221 lb)   24 98.4 kg (217 lb)   24 98.9 kg (218 lb)       nad  NCAT  pupils normal size  Skin nml no rashes or lesions  No resp distress, resp even and unlabored  Gravid nt, no rebound no guarding  No cyanosis or clubbing, edema appropriate for pregn    FHT: 150's      Assessment:        1. Encounter for supervision of normal pregnancy in multigravida in third trimester                Plan:      OS  Encouraged PNV  Pain, fever, bleeding precautions   RTC 3weeks

## 2025-02-17 ENCOUNTER — PATIENT MESSAGE (OUTPATIENT)
Dept: OTHER | Facility: OTHER | Age: 30
End: 2025-02-17
Payer: MEDICAID

## 2025-03-03 ENCOUNTER — PATIENT MESSAGE (OUTPATIENT)
Dept: OTHER | Facility: OTHER | Age: 30
End: 2025-03-03
Payer: MEDICAID

## 2025-03-07 ENCOUNTER — ROUTINE PRENATAL (OUTPATIENT)
Dept: OBSTETRICS AND GYNECOLOGY | Facility: CLINIC | Age: 30
End: 2025-03-07
Payer: MEDICAID

## 2025-03-07 VITALS
HEART RATE: 102 BPM | WEIGHT: 221.25 LBS | SYSTOLIC BLOOD PRESSURE: 119 MMHG | DIASTOLIC BLOOD PRESSURE: 79 MMHG | BODY MASS INDEX: 39.19 KG/M2

## 2025-03-07 DIAGNOSIS — Z34.83 ENCOUNTER FOR SUPERVISION OF NORMAL PREGNANCY IN MULTIGRAVIDA IN THIRD TRIMESTER: Primary | ICD-10-CM

## 2025-03-07 NOTE — PROGRESS NOTES
Subjective:       Patient ID: Sofia Lemos is a 29 y.o.  at 28w4d     Chief Complaint:  Routine Prenatal Visit      History of Present Illness  No complaints. Reports normal sx. Labs and history reviewed with pt.         Review of Systems  Denies n/v, f/c, dysuria, contractions,   VD, VB, round ligament pain, headaches, preE ROS       Objective:     Vitals:    25 1014   BP: 119/79   Pulse: 102     Wt Readings from Last 3 Encounters:   25 100.4 kg (221 lb 4 oz)   02/10/25 100.2 kg (221 lb)   24 98.4 kg (217 lb)       nad  NCAT  pupils normal size  Skin nml no rashes or lesions  No resp distress, resp even and unlabored  Gravid nt, no rebound no guarding  No cyanosis or clubbing, edema appropriate for pregn    FHT: 150's      Assessment:        1. Encounter for supervision of normal pregnancy in multigravida in third trimester                Plan:        Encouraged PNV  Pain, fever, bleeding precautions   RTC 3 weeks

## 2025-03-17 ENCOUNTER — PATIENT MESSAGE (OUTPATIENT)
Dept: OTHER | Facility: OTHER | Age: 30
End: 2025-03-17
Payer: MEDICAID

## 2025-03-20 ENCOUNTER — NURSE TRIAGE (OUTPATIENT)
Dept: ADMINISTRATIVE | Facility: CLINIC | Age: 30
End: 2025-03-20
Payer: MEDICAID

## 2025-03-20 NOTE — TELEPHONE ENCOUNTER
Pt is calling and states that she was sitting on a fabric stool gardening and she fell. She is currently 30 weeks pregnant. No more than a foot high. Denies bleeding and leakage of fluid. Denies any abdominal pain or cramping. Pt does feel the baby move. Pt states she fell right on her buttocks and  was able to get right up. Dispo- Home care. Pt educated and advised to call back with any further concerns.         Reason for Disposition   Slipped or lost balance, but did not fall to ground or floor    Additional Information   Negative: Major injury from dangerous force (e.g., fall > 10 feet or 3 meters)   Negative: [1] Major bleeding (e.g., actively dripping or spurting) AND [2] can't be stopped   Negative: Shock suspected (e.g., cold/pale/clammy skin, too weak to stand)   Negative: SEVERE abdominal pain   Negative: [1] Vaginal bleeding AND [2] pregnant 20 or more weeks   Negative: Sounds like a life-threatening emergency to the triager   Negative: [1] Vaginal bleeding AND [2] pregnant < 20 weeks  (Exception: Single episode of spotting.)   Negative: [1] Abdominal pain (not severe) AND [2] pregnant < 20 weeks   Negative: [1] Abdominal pain (not severe) AND [2] present > 1 hour   Negative: Sounds like a serious injury to the triager   Negative: [1] Pregnant 20 or more weeks AND [2] fall to ground or floor (e.g., walking and tripped)   Negative: [1] Pregnant 20 or more weeks AND [2] contractions   Negative: [1] Pregnant 20 or more weeks AND [2] abdomen pain   Negative: [1] Pregnant 20 or more weeks AND [2] leaking fluid   Negative: [1] Pregnant 23 or more weeks AND [2] no fetal movements or decreased fetal movements (e.g., kick count < 5 in 1 hour or < 10 in 2 hours)   Negative: Suspicious history for the injury   Negative: [1] Caller has URGENT question AND [2] triager unable to answer question   Negative: [1] No prior tetanus shots (or is not fully vaccinated) AND [2] any wound (e.g., cut, scrape)   Negative: [1] HIV  positive or severe immunodeficiency (severely weak immune system) AND [2] DIRTY cut or scrape   Negative: [1] Caller has NON-URGENT question AND [2] triager unable to answer question   Negative: [1] Last tetanus shot > 5 years ago AND [2] DIRTY cut or scrape   Negative: [1] Last tetanus shot > 10 years ago AND [2] CLEAN cut or scrape (e.g., object AND skin were clean)   Negative: [1] Pregnant < 20 weeks AND [2] fall to ground (e.g., walking and tripped) with no abdomen injury    Protocols used: Pregnancy - Fall-A-AH

## 2025-03-21 ENCOUNTER — ROUTINE PRENATAL (OUTPATIENT)
Dept: OBSTETRICS AND GYNECOLOGY | Facility: CLINIC | Age: 30
End: 2025-03-21
Payer: MEDICAID

## 2025-03-21 VITALS
DIASTOLIC BLOOD PRESSURE: 85 MMHG | WEIGHT: 225.19 LBS | HEART RATE: 96 BPM | SYSTOLIC BLOOD PRESSURE: 125 MMHG | BODY MASS INDEX: 39.89 KG/M2

## 2025-03-21 DIAGNOSIS — O16.9 ELEVATED BLOOD PRESSURE AFFECTING PREGNANCY, ANTEPARTUM: ICD-10-CM

## 2025-03-21 DIAGNOSIS — Z34.83 ENCOUNTER FOR SUPERVISION OF NORMAL PREGNANCY IN MULTIGRAVIDA IN THIRD TRIMESTER: Primary | ICD-10-CM

## 2025-03-21 PROCEDURE — 99214 OFFICE O/P EST MOD 30 MIN: CPT | Mod: S$PBB,TH,, | Performed by: OBSTETRICS & GYNECOLOGY

## 2025-03-21 NOTE — PROGRESS NOTES
Subjective:       Patient ID: Sofia Lemos is a 29 y.o.  at 30w4d     Chief Complaint:  Routine Prenatal Visit      History of Present Illness  No complaints. Reports normal sx. Labs and history reviewed with pt.         Review of Systems  Denies n/v, f/c, dysuria, contractions,   VD, VB, round ligament pain, headaches, preE ROS       Objective:     Vitals:    25 1040   BP: 125/85   Pulse: 96     Wt Readings from Last 3 Encounters:   25 102.2 kg (225 lb 3.2 oz)   25 100.4 kg (221 lb 4 oz)   02/10/25 100.2 kg (221 lb)       nad  NCAT  pupils normal size  Skin nml no rashes or lesions  No resp distress, resp even and unlabored  Gravid nt, no rebound no guarding  No cyanosis or clubbing, edema appropriate for pregn    FHT: 150's      Assessment:        1. Encounter for supervision of normal pregnancy in multigravida in third trimester    2. Elevated blood pressure affecting pregnancy, antepartum                Plan:        Encouraged PNV  Pain, fever, bleeding precautions   RTC 2 weeks

## 2025-03-31 ENCOUNTER — PATIENT MESSAGE (OUTPATIENT)
Dept: OTHER | Facility: OTHER | Age: 30
End: 2025-03-31
Payer: MEDICAID

## 2025-04-01 ENCOUNTER — ROUTINE PRENATAL (OUTPATIENT)
Dept: OBSTETRICS AND GYNECOLOGY | Facility: CLINIC | Age: 30
End: 2025-04-01
Payer: MEDICAID

## 2025-04-01 VITALS
BODY MASS INDEX: 39.86 KG/M2 | DIASTOLIC BLOOD PRESSURE: 79 MMHG | WEIGHT: 225 LBS | SYSTOLIC BLOOD PRESSURE: 120 MMHG | HEART RATE: 118 BPM

## 2025-04-01 DIAGNOSIS — O16.9 ELEVATED BLOOD PRESSURE AFFECTING PREGNANCY, ANTEPARTUM: ICD-10-CM

## 2025-04-01 DIAGNOSIS — Z34.83 ENCOUNTER FOR SUPERVISION OF NORMAL PREGNANCY IN MULTIGRAVIDA IN THIRD TRIMESTER: Primary | ICD-10-CM

## 2025-04-01 NOTE — PROGRESS NOTES
Subjective:       Patient ID: Sofia Lemos is a 29 y.o.  at 32w1d     Chief Complaint:  Routine Prenatal Visit      History of Present Illness  No complaints. Reports normal sx. Labs and history reviewed with pt.         Review of Systems  Denies n/v, f/c, dysuria, contractions,   VD, VB, round ligament pain, headaches, preE ROS       Objective:     Vitals:    25 1028   BP: 120/79   Pulse: (!) 118     Wt Readings from Last 3 Encounters:   25 102.1 kg (225 lb)   25 102.2 kg (225 lb 3.2 oz)   25 100.4 kg (221 lb 4 oz)       nad  NCAT  pupils normal size  Skin nml no rashes or lesions  No resp distress, resp even and unlabored  Gravid nt, no rebound no guarding  No cyanosis or clubbing, edema appropriate for pregn    FHT: 150's      Assessment:        1. Encounter for supervision of normal pregnancy in multigravida in third trimester    2. Elevated blood pressure affecting pregnancy, antepartum                Plan:        Encouraged PNV  Pain, fever, bleeding precautions   [id number]

## 2025-04-15 ENCOUNTER — ROUTINE PRENATAL (OUTPATIENT)
Dept: OBSTETRICS AND GYNECOLOGY | Facility: CLINIC | Age: 30
End: 2025-04-15
Payer: MEDICAID

## 2025-04-15 ENCOUNTER — TELEPHONE (OUTPATIENT)
Dept: OBSTETRICS AND GYNECOLOGY | Facility: CLINIC | Age: 30
End: 2025-04-15
Payer: MEDICAID

## 2025-04-15 VITALS
DIASTOLIC BLOOD PRESSURE: 82 MMHG | SYSTOLIC BLOOD PRESSURE: 129 MMHG | BODY MASS INDEX: 39.86 KG/M2 | HEART RATE: 97 BPM | WEIGHT: 225 LBS

## 2025-04-15 DIAGNOSIS — Z34.83 ENCOUNTER FOR SUPERVISION OF NORMAL PREGNANCY IN MULTIGRAVIDA IN THIRD TRIMESTER: Primary | ICD-10-CM

## 2025-04-15 PROCEDURE — 99214 OFFICE O/P EST MOD 30 MIN: CPT | Mod: S$PBB,TH,, | Performed by: OBSTETRICS & GYNECOLOGY

## 2025-04-15 NOTE — PROGRESS NOTES
Subjective:       Patient ID: Sofia Lemos is a 29 y.o.  at 34w1d     Chief Complaint:  Routine Prenatal Visit      History of Present Illness  No complaints. Reports normal sx. Labs and history reviewed with pt.         Review of Systems  Denies n/v, f/c, dysuria, contractions,   VD, VB, round ligament pain, headaches, preE ROS       Objective:     Vitals:    04/15/25 1038   BP: 129/82   Pulse: 97     Wt Readings from Last 3 Encounters:   04/15/25 102.1 kg (225 lb)   25 102.1 kg (225 lb)   25 102.2 kg (225 lb 3.2 oz)       nad  NCAT  pupils normal size  Skin nml no rashes or lesions  No resp distress, resp even and unlabored  Gravid nt, no rebound no guarding  No cyanosis or clubbing, edema appropriate for pregn    FHT: 150's      Assessment:        1. Encounter for supervision of normal pregnancy in multigravida in third trimester                Plan:        Encouraged PNV  Pain, fever, bleeding precautions   RTC 1 weeks

## 2025-04-15 NOTE — TELEPHONE ENCOUNTER
----- Message from Miriam sent at 4/15/2025 11:08 AM CDT -----  Type:  Call BackWho Called:pt  Does the patient know what this is regarding?:Transfer OB care Would the patient rather a call back or a response via [facility]? Call Best Call Back Number: [phone] Information:

## 2025-04-15 NOTE — TELEPHONE ENCOUNTER
Pt states that she would like to transfer care to [facility]    Pt states she does not have any complications    Accept or defer?

## 2025-04-21 ENCOUNTER — PATIENT MESSAGE (OUTPATIENT)
Dept: OTHER | Facility: OTHER | Age: 30
End: 2025-04-21
Payer: MEDICAID

## 2025-04-29 ENCOUNTER — INITIAL PRENATAL (OUTPATIENT)
Dept: OBSTETRICS AND GYNECOLOGY | Facility: CLINIC | Age: 30
End: 2025-04-29
Payer: MEDICAID

## 2025-04-29 VITALS — WEIGHT: 227.5 LBS | BODY MASS INDEX: 40.3 KG/M2 | SYSTOLIC BLOOD PRESSURE: 120 MMHG | DIASTOLIC BLOOD PRESSURE: 78 MMHG

## 2025-04-29 DIAGNOSIS — Z3A.36 36 WEEKS GESTATION OF PREGNANCY: Primary | ICD-10-CM

## 2025-04-29 LAB
BILIRUBIN, UA POC OHS: NEGATIVE
BLOOD, UA POC OHS: NEGATIVE
CLARITY, UA POC OHS: CLEAR
COLOR, UA POC OHS: YELLOW
GLUCOSE, UA POC OHS: NEGATIVE
KETONES, UA POC OHS: NEGATIVE
LEUKOCYTES, UA POC OHS: NEGATIVE
NITRITE, UA POC OHS: NEGATIVE
PH, UA POC OHS: 7
PROTEIN, UA POC OHS: 30
SPECIFIC GRAVITY, UA POC OHS: 1.02
UROBILINOGEN, UA POC OHS: 0.2

## 2025-04-29 PROCEDURE — 99999PBSHW POCT URINALYSIS(INSTRUMENT): Mod: PBBFAC,,,

## 2025-04-29 PROCEDURE — 99212 OFFICE O/P EST SF 10 MIN: CPT | Mod: PBBFAC,PN | Performed by: OBSTETRICS & GYNECOLOGY

## 2025-04-29 PROCEDURE — 87653 STREP B DNA AMP PROBE: CPT | Performed by: OBSTETRICS & GYNECOLOGY

## 2025-04-29 PROCEDURE — 99999 PR PBB SHADOW E&M-EST. PATIENT-LVL II: CPT | Mod: PBBFAC,,, | Performed by: OBSTETRICS & GYNECOLOGY

## 2025-04-29 PROCEDURE — 81003 URINALYSIS AUTO W/O SCOPE: CPT | Mod: PBBFAC,PN | Performed by: OBSTETRICS & GYNECOLOGY

## 2025-04-29 RX ORDER — FERROUS SULFATE 325(65) MG
325 TABLET, DELAYED RELEASE (ENTERIC COATED) ORAL 2 TIMES DAILY
Qty: 60 TABLET | Refills: 1 | Status: SHIPPED | OUTPATIENT
Start: 2025-04-29

## 2025-04-29 NOTE — PROGRESS NOTES
The patient presents with routine OB complaints.   Reports: Good fetal movements reported, No Bleeding or pains    2025  29 y.o. 36w1d Estimated Date of Delivery: 25, dating reviewed.   OB History    Para Term  AB Living   3 1 1  1 1   SAB IAB Ectopic Multiple Live Births      0 1      # Outcome Date GA Lbr Frandy/2nd Weight Sex Type Anes PTL Lv   3 Current            2 Term 10/09/21 39w2d 02:00 / 00:23 3.135 kg (6 lb 14.6 oz) M Vag-Vacuum EPI N MARY   1 AB                Prenatal labs reviewed and updated today    Review of Systems:  General ROS: negative for headache or visual changes  Breast ROS: negative for breast lumps  Gastrointestinal ROS: negative for constipation, diarrhea or nausea/vomiting  Musculoskeletal ROS: negative for pain in joints or swelling in face or hands.   Neurological ROS: negative for - headaches, numbness/tingling or visual changes      Physical Exam:  /78   Wt 103.2 kg (227 lb 8.2 oz)   LMP  (LMP Unknown)   BMI 40.30 kg/m²   Urine Dip: Pending  Fundal Height:cm  Fetal Heart Tones: 155bpm  Constitutional: She is oriented to person, place, and time. She appears well-developed and well-nourished. No distress. Normal weight   Cardiovascular: Normal rate.    Pulmonary/Chest: Effort normal. No respiratory distress  Abdominal: Soft, gravid, nontender. No rebound and no guarding.     Genitourinary: 1cm / 30%    Musculoskeletal: Normal range of motion, Minimal peripheral edema.   Neurological: She is alert and oriented to person, place, and time. Coordination normal.   Skin: Skin is warm and dry. She is not diaphoretic.  Psychiatric: She has a normal mood and affect.        Assessment:  29 y.o., at 36w1d Gestation   Estimated Date of Delivery: 25  Patient Active Problem List   Diagnosis    Term pregnancy       Plan:   Labs today: none  Orders today: u/s 1 week   MEds today: none  Procedures Today: GBS done today  Follow up 1 Weeks, bleeding/pain precautions ,   kick counts, labor precautions

## 2025-05-01 ENCOUNTER — PATIENT MESSAGE (OUTPATIENT)
Dept: OBSTETRICS AND GYNECOLOGY | Facility: CLINIC | Age: 30
End: 2025-05-01
Payer: MEDICAID

## 2025-05-01 LAB — GROUP B STREPTOCOCCUS, PCR (OHS): NEGATIVE

## 2025-05-06 ENCOUNTER — HOSPITAL ENCOUNTER (OUTPATIENT)
Dept: RADIOLOGY | Facility: HOSPITAL | Age: 30
Discharge: HOME | End: 2025-05-06
Attending: OBSTETRICS & GYNECOLOGY
Payer: MEDICAID

## 2025-05-06 ENCOUNTER — ROUTINE PRENATAL (OUTPATIENT)
Dept: OBSTETRICS AND GYNECOLOGY | Facility: CLINIC | Age: 30
End: 2025-05-06
Payer: MEDICAID

## 2025-05-06 VITALS
SYSTOLIC BLOOD PRESSURE: 120 MMHG | DIASTOLIC BLOOD PRESSURE: 68 MMHG | WEIGHT: 229.25 LBS | BODY MASS INDEX: 40.61 KG/M2

## 2025-05-06 DIAGNOSIS — Z3A.37 37 WEEKS GESTATION OF PREGNANCY: Primary | ICD-10-CM

## 2025-05-06 DIAGNOSIS — Z3A.36 36 WEEKS GESTATION OF PREGNANCY: ICD-10-CM

## 2025-05-06 LAB
BILIRUBIN, UA POC OHS: NEGATIVE
BLOOD, UA POC OHS: NEGATIVE
CLARITY, UA POC OHS: CLEAR
COLOR, UA POC OHS: YELLOW
GLUCOSE, UA POC OHS: NEGATIVE
KETONES, UA POC OHS: NEGATIVE
LEUKOCYTES, UA POC OHS: ABNORMAL
NITRITE, UA POC OHS: NEGATIVE
PH, UA POC OHS: 7
PROTEIN, UA POC OHS: NEGATIVE
SPECIFIC GRAVITY, UA POC OHS: <=1.005
UROBILINOGEN, UA POC OHS: 0.2

## 2025-05-06 PROCEDURE — 99999PBSHW POCT URINALYSIS(INSTRUMENT): Mod: PBBFAC,,,

## 2025-05-06 PROCEDURE — 99999 PR PBB SHADOW E&M-EST. PATIENT-LVL II: CPT | Mod: PBBFAC,,, | Performed by: OBSTETRICS & GYNECOLOGY

## 2025-05-06 PROCEDURE — 99212 OFFICE O/P EST SF 10 MIN: CPT | Mod: PBBFAC,25,PN | Performed by: OBSTETRICS & GYNECOLOGY

## 2025-05-06 PROCEDURE — 81003 URINALYSIS AUTO W/O SCOPE: CPT | Mod: PBBFAC,PN | Performed by: OBSTETRICS & GYNECOLOGY

## 2025-05-06 PROCEDURE — 76816 OB US FOLLOW-UP PER FETUS: CPT | Mod: TC,PN

## 2025-05-06 NOTE — PROGRESS NOTES
The patient presents with routine OB complaints.   Reports: Good fetal movements reported, No Bleeding or pains    2025  29 y.o. 37w1d Estimated Date of Delivery: 25, dating reviewed.   OB History    Para Term  AB Living   3 1 1  1 1   SAB IAB Ectopic Multiple Live Births      0 1      # Outcome Date GA Lbr Frandy/2nd Weight Sex Type Anes PTL Lv   3 Current            2 Term 10/09/21 39w2d 02:00 / 00:23 3.135 kg (6 lb 14.6 oz) M Vag-Vacuum EPI N MARY   1 AB                Prenatal labs reviewed and updated today    Review of Systems:  General ROS: negative for headache or visual changes  Breast ROS: negative for breast lumps  Gastrointestinal ROS: negative for  constipation, diarrhea or nausea/vomiting  Musculoskeletal ROS: negative for pain in joints or swelling in face or hands.   Neurological ROS: negative for - headaches, numbness/tingling or visual changes      Physical Exam:  /68   Wt 104 kg (229 lb 4.5 oz)   LMP  (LMP Unknown)   BMI 40.61 kg/m²   Urine Dip: Pending  Fundal Height:36cm  Fetal Heart Tones: 129bpm  Constitutional: She is oriented to person, place, and time. She appears well-developed and well-nourished. No distress. Overweight   Cardiovascular: Normal rate.    Pulmonary/Chest: Effort normal. No respiratory distress  Abdominal: Soft, gravid, nontender. No rebound and no guarding.     Genitourinary: Deferred     Musculoskeletal: Normal range of motion, Minimal peripheral edema.   Neurological: She is alert and oriented to person, place, and time. Coordination normal.   Skin: Skin is warm and dry. She is not diaphoretic.  Psychiatric: She has a normal mood and affect.        Assessment:  29 y.o., at 37w1d Gestation   Estimated Date of Delivery: 25  Patient Active Problem List   Diagnosis    Term pregnancy       Plan:   Labs today: none  Orders today: none  MEds today: none  Procedures Today: u/s today  Follow up 1 Weeks, bleeding/pain precautions ,  kick counts,  labor precautions

## 2025-05-13 ENCOUNTER — ROUTINE PRENATAL (OUTPATIENT)
Dept: OBSTETRICS AND GYNECOLOGY | Facility: CLINIC | Age: 30
End: 2025-05-13
Payer: MEDICAID

## 2025-05-13 VITALS
WEIGHT: 228.38 LBS | SYSTOLIC BLOOD PRESSURE: 116 MMHG | DIASTOLIC BLOOD PRESSURE: 72 MMHG | BODY MASS INDEX: 40.46 KG/M2

## 2025-05-13 DIAGNOSIS — Z3A.38 38 WEEKS GESTATION OF PREGNANCY: Primary | ICD-10-CM

## 2025-05-13 DIAGNOSIS — R82.90 ABNORMAL URINE FINDINGS: ICD-10-CM

## 2025-05-13 LAB
BILIRUBIN, UA POC OHS: NEGATIVE
BLOOD, UA POC OHS: ABNORMAL
CLARITY, UA POC OHS: CLEAR
COLOR, UA POC OHS: YELLOW
GLUCOSE, UA POC OHS: NEGATIVE
KETONES, UA POC OHS: NEGATIVE
LEUKOCYTES, UA POC OHS: ABNORMAL
NITRITE, UA POC OHS: NEGATIVE
PH, UA POC OHS: 7.5
PROTEIN, UA POC OHS: 30
SPECIFIC GRAVITY, UA POC OHS: 1.02
UROBILINOGEN, UA POC OHS: 1

## 2025-05-13 PROCEDURE — 81003 URINALYSIS AUTO W/O SCOPE: CPT | Mod: PBBFAC,PN | Performed by: OBSTETRICS & GYNECOLOGY

## 2025-05-13 PROCEDURE — 99999PBSHW POCT URINALYSIS(INSTRUMENT): Mod: PBBFAC,,,

## 2025-05-13 PROCEDURE — 99212 OFFICE O/P EST SF 10 MIN: CPT | Mod: PBBFAC,PN | Performed by: OBSTETRICS & GYNECOLOGY

## 2025-05-13 PROCEDURE — 99213 OFFICE O/P EST LOW 20 MIN: CPT | Mod: TH,S$PBB,, | Performed by: OBSTETRICS & GYNECOLOGY

## 2025-05-13 PROCEDURE — 87086 URINE CULTURE/COLONY COUNT: CPT | Performed by: OBSTETRICS & GYNECOLOGY

## 2025-05-13 PROCEDURE — 99999 PR PBB SHADOW E&M-EST. PATIENT-LVL II: CPT | Mod: PBBFAC,,, | Performed by: OBSTETRICS & GYNECOLOGY

## 2025-05-13 NOTE — PROGRESS NOTES
The patient presents with routine OB complaints.   Reports: Good fetal movements reported, No Bleeding or pains    2025  29 y.o. 38w1d Estimated Date of Delivery: 25, dating reviewed.   OB History    Para Term  AB Living   3 1 1  1 1   SAB IAB Ectopic Multiple Live Births      0 1      # Outcome Date GA Lbr Frandy/2nd Weight Sex Type Anes PTL Lv   3 Current            2 Term 10/09/21 39w2d 02:00 / 00:23 3.135 kg (6 lb 14.6 oz) M Vag-Vacuum EPI N MARY   1 AB                Prenatal labs reviewed and updated today    Review of Systems:  General ROS: negative for headache or visual changes  Breast ROS: negative for breast lumps  Gastrointestinal ROS: negative for  constipation, diarrhea or nausea/vomiting  Musculoskeletal ROS: negative for pain in joints or swelling in face or hands.   Neurological ROS: negative for - headaches, numbness/tingling or visual changes      Physical Exam:  /72   Wt 103.6 kg (228 lb 6.3 oz)   LMP  (LMP Unknown)   BMI 40.46 kg/m²   Urine Dip: Pending  Fundal Height: cm  Fetal Heart Tones: 149bpm  Constitutional: She is oriented to person, place, and time. She appears well-developed and well-nourished. No distress. Overweight   Cardiovascular: Normal rate.    Pulmonary/Chest: Effort normal. No respiratory distress  Abdominal: Soft, gravid, nontender. No rebound and no guarding.     Genitourinary: 2cm / 50%    Musculoskeletal: Normal range of motion, Minimal peripheral edema.   Neurological: She is alert and oriented to person, place, and time. Coordination normal.   Skin: Skin is warm and dry. She is not diaphoretic.  Psychiatric: She has a normal mood and affect.        Assessment:  29 y.o., at 38w1d Gestation   Estimated Date of Delivery: 25  Patient Active Problem List   Diagnosis    Term pregnancy       Plan:   Labs today: none  Orders today: none  MEds today: none  Procedures Today: none  Follow up 1 Weeks, bleeding/pain precautions ,  kick counts,  labor precautions   Declined induction

## 2025-05-15 LAB — BACTERIA UR CULT: NORMAL

## 2025-05-22 ENCOUNTER — ROUTINE PRENATAL (OUTPATIENT)
Dept: OBSTETRICS AND GYNECOLOGY | Facility: CLINIC | Age: 30
End: 2025-05-22
Payer: MEDICAID

## 2025-05-22 VITALS
SYSTOLIC BLOOD PRESSURE: 110 MMHG | WEIGHT: 229.94 LBS | BODY MASS INDEX: 40.73 KG/M2 | DIASTOLIC BLOOD PRESSURE: 72 MMHG

## 2025-05-22 DIAGNOSIS — Z3A.39 39 WEEKS GESTATION OF PREGNANCY: Primary | ICD-10-CM

## 2025-05-22 LAB
BILIRUBIN, UA POC OHS: NEGATIVE
BLOOD, UA POC OHS: NEGATIVE
CLARITY, UA POC OHS: CLEAR
COLOR, UA POC OHS: YELLOW
GLUCOSE, UA POC OHS: NEGATIVE
KETONES, UA POC OHS: NEGATIVE
LEUKOCYTES, UA POC OHS: ABNORMAL
NITRITE, UA POC OHS: NEGATIVE
PH, UA POC OHS: 7
PROTEIN, UA POC OHS: NEGATIVE
SPECIFIC GRAVITY, UA POC OHS: 1.01
UROBILINOGEN, UA POC OHS: 0.2

## 2025-05-22 PROCEDURE — 99999 PR PBB SHADOW E&M-EST. PATIENT-LVL II: CPT | Mod: PBBFAC,,, | Performed by: OBSTETRICS & GYNECOLOGY

## 2025-05-22 PROCEDURE — 99999PBSHW POCT URINALYSIS(INSTRUMENT): Mod: PBBFAC,,,

## 2025-05-22 PROCEDURE — 81003 URINALYSIS AUTO W/O SCOPE: CPT | Mod: PBBFAC,PN | Performed by: OBSTETRICS & GYNECOLOGY

## 2025-05-22 PROCEDURE — 99212 OFFICE O/P EST SF 10 MIN: CPT | Mod: PBBFAC,PN | Performed by: OBSTETRICS & GYNECOLOGY

## 2025-05-22 RX ORDER — FERROUS SULFATE 325(65) MG
325 TABLET ORAL 2 TIMES DAILY
COMMUNITY
Start: 2025-04-29 | End: 2025-05-22 | Stop reason: SDUPTHER

## 2025-05-22 NOTE — PROGRESS NOTES
The patient presents with routine OB complaints.   Reports: Good fetal movements reported, No Bleeding or pains    2025  29 y.o. 39w3d Estimated Date of Delivery: 25, dating reviewed.   OB History    Para Term  AB Living   3 1 1  1 1   SAB IAB Ectopic Multiple Live Births      0 1      # Outcome Date GA Lbr Frandy/2nd Weight Sex Type Anes PTL Lv   3 Current            2 Term 10/09/21 39w2d 02:00 / 00:23 3.135 kg (6 lb 14.6 oz) M Vag-Vacuum EPI N MARY   1 AB                Prenatal labs reviewed and updated today    Review of Systems:  General ROS: negative for headache or visual changes  Breast ROS: negative for breast lumps  Gastrointestinal ROS: negative for constipation, diarrhea or nausea/vomiting  Musculoskeletal ROS: negative for pain in joints or swelling in face or hands.   Neurological ROS: negative for - headaches, numbness/tingling or visual changes      Physical Exam:  /72   Wt 104.3 kg (229 lb 15 oz)   LMP  (LMP Unknown)   BMI 40.73 kg/m²   Urine Dip: Pending  Fundal Height: cm  Fetal Heart Tones: 124bpm  Constitutional: She is oriented to person, place, and time. She appears well-developed and well-nourished. No distress. Overweight   Cardiovascular: Normal rate.    Pulmonary/Chest: Effort normal. No respiratory distress  Abdominal: Soft, gravid, nontender. No rebound and no guarding.     Genitourinary: 3cm / 50%    Musculoskeletal: Normal range of motion, Minimal peripheral edema.   Neurological: She is alert and oriented to person, place, and time. Coordination normal.   Skin: Skin is warm and dry. She is not diaphoretic.  Psychiatric: She has a normal mood and affect.        Assessment:  29 y.o., at 39w3d Gestation   Estimated Date of Delivery: 25  Patient Active Problem List   Diagnosis    Term pregnancy       Plan:   Labs today: none  Orders today: none  MEds today: none  Procedures Today: none  Follow up 1 Weeks, bleeding/pain precautions ,  kick counts, labor  precautions

## 2025-05-26 ENCOUNTER — TELEPHONE (OUTPATIENT)
Dept: OBSTETRICS AND GYNECOLOGY | Facility: CLINIC | Age: 30
End: 2025-05-26
Payer: MEDICAID

## 2025-05-26 NOTE — TELEPHONE ENCOUNTER
----- Message from Sophy sent at 5/26/2025  1:23 PM CDT -----  Type: Needs Medical AdviceWho Called:  LucretiaSymptoms (please be specific):  How long has patient had these symptoms:  Pharmacy name and phone #:  Best Call Back Number: [phone] Information: patient is calling to see if she can be induced either today or tomorrow. Pls advise patient

## 2025-05-26 NOTE — TELEPHONE ENCOUNTER
Informed pt that she will need to discuss this matter with provider at next appt  Pt verb understanding

## 2025-05-29 ENCOUNTER — ROUTINE PRENATAL (OUTPATIENT)
Dept: OBSTETRICS AND GYNECOLOGY | Facility: CLINIC | Age: 30
End: 2025-05-29
Payer: MEDICAID

## 2025-05-29 VITALS — BODY MASS INDEX: 40.65 KG/M2 | WEIGHT: 229.5 LBS | DIASTOLIC BLOOD PRESSURE: 70 MMHG | SYSTOLIC BLOOD PRESSURE: 122 MMHG

## 2025-05-29 DIAGNOSIS — Z3A.40 40 WEEKS GESTATION OF PREGNANCY: Primary | ICD-10-CM

## 2025-05-29 LAB
BILIRUBIN, UA POC OHS: NEGATIVE
BLOOD, UA POC OHS: NEGATIVE
CLARITY, UA POC OHS: CLEAR
COLOR, UA POC OHS: YELLOW
GLUCOSE, UA POC OHS: NEGATIVE
KETONES, UA POC OHS: NEGATIVE
LEUKOCYTES, UA POC OHS: ABNORMAL
NITRITE, UA POC OHS: NEGATIVE
PH, UA POC OHS: 7.5
PROTEIN, UA POC OHS: NEGATIVE
SPECIFIC GRAVITY, UA POC OHS: 1.02
UROBILINOGEN, UA POC OHS: 0.2

## 2025-05-29 PROCEDURE — 99999PBSHW POCT URINALYSIS(INSTRUMENT): Mod: PBBFAC,,,

## 2025-05-29 PROCEDURE — 99999 PR PBB SHADOW E&M-EST. PATIENT-LVL II: CPT | Mod: PBBFAC,,, | Performed by: OBSTETRICS & GYNECOLOGY

## 2025-05-29 PROCEDURE — 81003 URINALYSIS AUTO W/O SCOPE: CPT | Mod: PBBFAC,PN | Performed by: OBSTETRICS & GYNECOLOGY

## 2025-05-29 PROCEDURE — 99212 OFFICE O/P EST SF 10 MIN: CPT | Mod: PBBFAC,PN | Performed by: OBSTETRICS & GYNECOLOGY

## 2025-05-29 NOTE — PROGRESS NOTES
Admission History and Physical:     2025      History of present Illness:     Sofia Lemos is a 29 y.o. -American Female   who presents 2025 at 40w3d. Estimated Date of Delivery: 25. The patient presents for induction with complaints of pressure, fav cervix, counseled on Risks, Benefits and Alternatives to induction, discused with patient in detail, all questions answered and patient agreed to proceed.  .    Review of patient's allergies indicates:  No Known Allergies    Patient Active Problem List   Diagnosis    Term pregnancy       OB History    Para Term  AB Living   3 1 1  1 1   SAB IAB Ectopic Multiple Live Births      0 1      # Outcome Date GA Lbr Frandy/2nd Weight Sex Type Anes PTL Lv   3 Current            2 Term 10/09/21 39w2d 02:00 / 00:23 3.135 kg (6 lb 14.6 oz) M Vag-Vacuum EPI N MARY   1 AB                Past Medical History:   Diagnosis Date    Abnormal Pap smear of cervix        History reviewed. No pertinent surgical history.    Medications Ordered Prior to Encounter[1]    Social History[2]    Family History   Problem Relation Name Age of Onset    Ovarian cancer Maternal Grandmother      Breast cancer Neg Hx         Review of Systems:  General ROS: negative for - chills, fever, headache or visual changes  Breast ROS: negative for breast lumps  Respiratory ROS: no cough, shortness of breath, or wheezing  Cardiovascular ROS: no chest pain or dyspnea on exertion  Gastrointestinal ROS: negative for - change in bowel habits, constipation, diarrhea or nausea/vomiting  Musculoskeletal ROS: negative for - muscular weakness, pain in joints or swelling in face or hands.   Neurological ROS: negative for - headaches, numbness/tingling or visual changes    Physical Exam:  Vital Signs: stable, /70   Wt 104.1 kg (229 lb 8 oz)   LMP  (LMP Unknown)   BMI 40.65 kg/m²    NST: Pending on admission  Constitutional: She is oriented to person, place, and time. She  appears well-developed and well-nourished. No distress.   HENT:   Head: Normocephalic and atraumatic.   Eyes: Conjunctivae and EOM are normal. No scleral icterus.   Neck: Normal range of motion. Neck supple. No tracheal deviation present.   Cardiovascular: Normal rate.    Pulmonary/Chest: Effort normal. No respiratory distress. She exhibits no tenderness.  Breasts: deferred  Abdominal: Soft, gravid, nontender. There is no rebound and no guarding. EFW: 8 #  Genitourinary:    External rectal exam shows no thrombosed external hemorrhoids.    Pelvic exam was performed with patient supine.   There is no rash, lesion or injury on the right labia.   There is no rash, lesion or injury on the left labia.   No bleeding and no signs of injury around the vaginal introitus, urethra is without lesions.  Bimanual exam:   Clinical pelvimetry is adequate, average intraspinous diameter, gynecoid.   Cervix is : 3cm / 40% / -3, Adequate pelvimetry, vertex presentation.   Musculoskeletal: Normal range of motion. Minimal peripheral edema.   Lymphadenopathy: No inguinal adenopathy present.   Neurological: She is alert and oriented to person, place, and time. Coordination normal.   Skin: Skin is warm and dry. She is not diaphoretic.   Psychiatric: She has a normal mood and affect.      Assessment:  29 y.o., -American Female   at 40w3d Gestation   Reassuring fetal testing  Last Estimated feta  GBS= NEGATIVE  Fav cervix >40 weeks  Us/ 35wks= 2502 g with a Hadlock percentile of 8%.   Counseled today on Risks, benefits and alternatives to vaginal delivery and c/section, including bleeding, infection, transfusion, and damage to pelvic organs and she agreed to proceed .      Plan:  Proceed with pitocin 5am, anticipate Spontaneous vaginal Delivery            Nestor Carroll M.D., FACOG          [1]   Current Outpatient Medications on File Prior to Visit   Medication Sig Dispense Refill    ferrous sulfate 325 (65 FE) MG EC tablet  Take 1 tablet (325 mg total) by mouth 2 (two) times daily. 60 tablet 1     No current facility-administered medications on file prior to visit.   [2]   Social History  Socioeconomic History    Marital status: Single   Tobacco Use    Smoking status: Never    Smokeless tobacco: Never   Substance and Sexual Activity    Alcohol use: Not Currently    Drug use: Never    Sexual activity: Yes     Partners: Male     Birth control/protection: None

## 2025-07-03 ENCOUNTER — POSTPARTUM VISIT (OUTPATIENT)
Dept: OBSTETRICS AND GYNECOLOGY | Facility: CLINIC | Age: 30
End: 2025-07-03
Payer: MEDICAID

## 2025-07-03 VITALS
DIASTOLIC BLOOD PRESSURE: 74 MMHG | SYSTOLIC BLOOD PRESSURE: 114 MMHG | BODY MASS INDEX: 38.74 KG/M2 | WEIGHT: 218.69 LBS

## 2025-07-03 PROCEDURE — 99999 PR PBB SHADOW E&M-EST. PATIENT-LVL III: CPT | Mod: PBBFAC,,, | Performed by: OBSTETRICS & GYNECOLOGY

## 2025-07-03 PROCEDURE — 99213 OFFICE O/P EST LOW 20 MIN: CPT | Mod: PBBFAC,PN | Performed by: OBSTETRICS & GYNECOLOGY

## 2025-07-03 NOTE — PROGRESS NOTES
History of Present Illness:   Pateint presents today  4 weeks postPartum, status post Spontaneous vaginal Delivery  , with complaint of none - not interested in Birth control .      Past medical and surgical history reviewed.   I have reviewed the patient's medical history in detail and updated the computerized patient record.    Physical exam:  Vital Signs: as above, reviewed.  Constitutional: She is oriented to person, place, and time. She appears well-developed and well-nourished. No distress.   HENT:   Head: Normocephalic and atraumatic.   Eyes: Conjunctivae and EOM are normal. No scleral icterus.   Neck: Normal range of motion. Neck supple. No tracheal deviation present.   Cardiovascular: Normal rate.    Pulmonary/Chest: Effort normal. No respiratory distress. She exhibits no tenderness.  Breasts: deferred, breast feeding  Abdominal: Soft. She exhibits no distension and no mass. There is no rebound and no guarding.   Genitourinary:    External rectal exam shows no thrombosed external hemorrhoids.    Pelvic exam was performed with patient supine.   No labial fusion.   There is no rash, lesion or injury on the right labia.   There is no rash, lesion or injury on the left labia.   No bleeding and no signs of injury around the vaginal introitus, healed well, urethra is without lesions and well supported. The cervix is visualized with no discharge, lesions or friability.   No vaginal discharge found.    No significant Cystocele, Enterocele or rectocele, and uterus well supported.   Bimanual exam:   The urethra is normal to palpation and there are no palpable vaginal wall masses.   Uterus is not deviated, not enlarged, not fixed, normal shape and not tender.   Cervix exhibits no motion tenderness.    Right adnexum displays no mass and no tenderness.   Left adnexum displays no mass and no tenderness.  Musculoskeletal: Normal range of motion.   Lymphadenopathy: No inguinal adenopathy present.   Neurological: She is  alert and oriented to person, place, and time. Coordination normal.   Skin: Skin is warm and dry. She is not diaphoretic.   Psychiatric: She has a normal mood and affect.    Assessment:  Normal postpartum     Plan:  Follow up 2 weeks

## 2025-07-17 ENCOUNTER — POSTPARTUM VISIT (OUTPATIENT)
Dept: OBSTETRICS AND GYNECOLOGY | Facility: CLINIC | Age: 30
End: 2025-07-17
Payer: MEDICAID

## 2025-07-17 VITALS — SYSTOLIC BLOOD PRESSURE: 124 MMHG | DIASTOLIC BLOOD PRESSURE: 76 MMHG | WEIGHT: 218.5 LBS | BODY MASS INDEX: 38.7 KG/M2

## 2025-07-17 PROCEDURE — 99999 PR PBB SHADOW E&M-EST. PATIENT-LVL III: CPT | Mod: PBBFAC,,, | Performed by: OBSTETRICS & GYNECOLOGY

## 2025-07-17 PROCEDURE — 99213 OFFICE O/P EST LOW 20 MIN: CPT | Mod: PBBFAC,PN | Performed by: OBSTETRICS & GYNECOLOGY

## 2025-07-17 NOTE — PROGRESS NOTES
History of Present Illness:   Pateint presents today  6 weeks postPartum, status post Spontaneous vaginal Delivery  , with complaint of none.      Past medical and surgical history reviewed.   I have reviewed the patient's medical history in detail and updated the computerized patient record.    Physical exam:  Vital Signs: as above, reviewed.  Constitutional: She is oriented to person, place, and time. She appears well-developed and well-nourished. No distress.   HENT:   Head: Normocephalic and atraumatic.   Eyes: Conjunctivae and EOM are normal. No scleral icterus.   Neck: Normal range of motion. Neck supple. No tracheal deviation present.   Cardiovascular: Normal rate.    Pulmonary/Chest: Effort normal. No respiratory distress. She exhibits no tenderness.  Breasts: deferred, breast feeding  Abdominal: Soft. She exhibits no distension and no mass. There is no rebound and no guarding.   Genitourinary:    External rectal exam shows no thrombosed external hemorrhoids.    Pelvic exam was performed with patient supine.   No labial fusion.   There is no rash, lesion or injury on the right labia.   There is no rash, lesion or injury on the left labia.   No bleeding and no signs of injury around the vaginal introitus, healed well, urethra is without lesions and well supported. The cervix is visualized with no discharge, lesions or friability.   No vaginal discharge found.    No significant Cystocele, Enterocele or rectocele, and uterus well supported.   Bimanual exam:   The urethra is normal to palpation and there are no palpable vaginal wall masses.   Uterus is not deviated, not enlarged, not fixed, normal shape and not tender.   Cervix exhibits no motion tenderness.    Right adnexum displays no mass and no tenderness.   Left adnexum displays no mass and no tenderness.  Musculoskeletal: Normal range of motion.   Lymphadenopathy: No inguinal adenopathy present.   Neurological: She is alert and oriented to person, place,  and time. Coordination normal.   Skin: Skin is warm and dry. She is not diaphoretic.   Psychiatric: She has a normal mood and affect.    Assessment:  Normal pp exam    Plan:  Follow up  4 months  Annual PAP